# Patient Record
Sex: FEMALE | URBAN - METROPOLITAN AREA
[De-identification: names, ages, dates, MRNs, and addresses within clinical notes are randomized per-mention and may not be internally consistent; named-entity substitution may affect disease eponyms.]

---

## 2020-06-02 ENCOUNTER — APPOINTMENT (RX ONLY)
Dept: URBAN - METROPOLITAN AREA CLINIC 96 | Facility: CLINIC | Age: 50
Setting detail: DERMATOLOGY
End: 2020-06-02

## 2020-06-02 DIAGNOSIS — L81.1 CHLOASMA: ICD-10-CM

## 2020-06-02 PROCEDURE — ? ADDITIONAL NOTES

## 2020-06-02 PROCEDURE — ? PRESCRIPTION

## 2020-06-02 PROCEDURE — ? COUNSELING

## 2020-06-02 PROCEDURE — 99202 OFFICE O/P NEW SF 15 MIN: CPT

## 2020-06-02 RX ORDER — HYDROQUINONE 40 MG/G
CREAM TOPICAL QHS
Qty: 1 | Refills: 0 | Status: ERX | COMMUNITY
Start: 2020-06-02

## 2020-06-02 RX ADMIN — HYDROQUINONE: 40 CREAM TOPICAL at 00:00

## 2020-06-02 ASSESSMENT — LOCATION SIMPLE DESCRIPTION DERM
LOCATION SIMPLE: LEFT CHEEK
LOCATION SIMPLE: RIGHT CHEEK

## 2020-06-02 ASSESSMENT — LOCATION DETAILED DESCRIPTION DERM
LOCATION DETAILED: RIGHT SUPERIOR CENTRAL BUCCAL CHEEK
LOCATION DETAILED: LEFT LATERAL MALAR CHEEK
LOCATION DETAILED: LEFT INFERIOR CENTRAL MALAR CHEEK
LOCATION DETAILED: LEFT SUPERIOR CENTRAL BUCCAL CHEEK

## 2020-06-02 ASSESSMENT — SEVERITY ASSESSMENT: SEVERITY: MODERATE, MODERATELY DARKER THAN THE SURROUNDING NORMAL SKIN

## 2020-06-02 ASSESSMENT — LOCATION ZONE DERM: LOCATION ZONE: FACE

## 2020-06-02 NOTE — PROCEDURE: ADDITIONAL NOTES
Additional Notes: Patient consent was obtained to proceed with the visit and recommended plan of care after discussion of all risks and benefits, including the risks of COVID-19 exposure.
Detail Level: Simple
Additional Notes: Sunblock daily recommended., Avenue A-Oxitive serum, Avenue A- Oxitive antioxidant water cream, Avenue Retinol 0.05
Detail Level: Zone

## 2023-10-10 ENCOUNTER — APPOINTMENT (RX ONLY)
Dept: URBAN - METROPOLITAN AREA CLINIC 96 | Facility: CLINIC | Age: 53
Setting detail: DERMATOLOGY
End: 2023-10-10

## 2023-10-10 DIAGNOSIS — D18.0 HEMANGIOMA: ICD-10-CM

## 2023-10-10 PROBLEM — D18.01 HEMANGIOMA OF SKIN AND SUBCUTANEOUS TISSUE: Status: ACTIVE | Noted: 2023-10-10

## 2023-10-10 PROCEDURE — ? COUNSELING

## 2023-10-10 PROCEDURE — 99202 OFFICE O/P NEW SF 15 MIN: CPT

## 2023-10-10 ASSESSMENT — LOCATION SIMPLE DESCRIPTION DERM
LOCATION SIMPLE: LEFT FOREARM
LOCATION SIMPLE: RIGHT PRETIBIAL REGION
LOCATION SIMPLE: RIGHT THIGH

## 2023-10-10 ASSESSMENT — LOCATION ZONE DERM
LOCATION ZONE: LEG
LOCATION ZONE: ARM

## 2023-10-10 ASSESSMENT — LOCATION DETAILED DESCRIPTION DERM
LOCATION DETAILED: RIGHT PROXIMAL PRETIBIAL REGION
LOCATION DETAILED: RIGHT ANTERIOR DISTAL THIGH
LOCATION DETAILED: LEFT PROXIMAL DORSAL FOREARM